# Patient Record
Sex: MALE | Race: WHITE
[De-identification: names, ages, dates, MRNs, and addresses within clinical notes are randomized per-mention and may not be internally consistent; named-entity substitution may affect disease eponyms.]

---

## 2017-01-10 ENCOUNTER — HOSPITAL ENCOUNTER (OUTPATIENT)
Dept: HOSPITAL 45 - X.SURG | Age: 57
Discharge: HOME | End: 2017-01-10
Attending: SURGERY
Payer: COMMERCIAL

## 2017-01-10 VITALS — SYSTOLIC BLOOD PRESSURE: 119 MMHG | OXYGEN SATURATION: 97 % | DIASTOLIC BLOOD PRESSURE: 74 MMHG | HEART RATE: 52 BPM

## 2017-01-10 VITALS
BODY MASS INDEX: 33.82 KG/M2 | WEIGHT: 215.46 LBS | WEIGHT: 215.46 LBS | HEIGHT: 67.01 IN | BODY MASS INDEX: 33.82 KG/M2 | HEIGHT: 67.01 IN

## 2017-01-10 VITALS — TEMPERATURE: 97.52 F

## 2017-01-10 DIAGNOSIS — I73.9: ICD-10-CM

## 2017-01-10 DIAGNOSIS — L91.8: ICD-10-CM

## 2017-01-10 DIAGNOSIS — I25.10: ICD-10-CM

## 2017-01-10 DIAGNOSIS — I10: ICD-10-CM

## 2017-01-10 DIAGNOSIS — Z79.899: ICD-10-CM

## 2017-01-10 DIAGNOSIS — E55.9: ICD-10-CM

## 2017-01-10 DIAGNOSIS — F41.9: ICD-10-CM

## 2017-01-10 DIAGNOSIS — R73.9: ICD-10-CM

## 2017-01-10 DIAGNOSIS — E78.5: ICD-10-CM

## 2017-01-10 DIAGNOSIS — L72.0: Primary | ICD-10-CM

## 2017-01-10 NOTE — ANESTHESIA PROGRESS NT - [facility]
Anesthesia Post Op Note


Date & Time


Rod 10, 2017 at 11:09





Vital Signs


Pain Intensity:  0





 Vital Signs Past 12 Hours








  Date Time  Temp Pulse Resp B/P Pulse Ox O2 Delivery O2 Flow Rate FiO2


 


1/10/17 09:06 36.3 75 16 138/91 95 Room Air  











Notes


Mental Status:  alert / awake / arousable, participated in evaluation


Pt Amnestic to Procedure:  Yes


Nausea / Vomiting:  adequately controlled


Pain:  adequately controlled


Airway Patency, RR, SpO2:  stable & adequate


BP & HR:  stable & adequate


Hydration State:  stable & adequate


Anesthetic Complications:  no major complications apparent

## 2017-01-10 NOTE — [facility] OPERATIVE REPORT
Operative Report


Operative Date


Rod 10, 2017.





Pre-Operative Diagnosis





Sebaceous Cyst On Back x2, Left Groin Skin Tag





Procedure(s) Performed


excision seb cysts times 2 back; excision skin tag groin





Surgeon


Dr. Nicholson





Assistant Surgeon(s)


None





Estimated Blood Loss


5 ml





Specimens





A.) Sebaceous Cyst On Back X2





Anesthesia


mac





Complication(s)


None





Disposition


Recovery Room / PACU


I attest to the content of the Intraoperative Record and any orders documented 

therein.  Any exceptions are noted below.

## 2017-01-10 NOTE — OPERATIVE REPORT
DATE OF OPERATION:  01/10/2017

 

PREOPERATIVE DIAGNOSIS:  Symptomatic cyst of the upper back, as well as a

symptomatic groin skin tag.

 

POSTOPERATIVE DIAGNOSIS:  Same.

 

PROCEDURE:

1.  Excision of sebaceous cyst x2 on the back, one measuring 3 cm and one

measuring 4 cm.

2.  Excision of skin tag of the left groin measuring 1 cm.

 

SURGEON:  Dr. Nicholson.

 

ESTIMATED BLOOD LOSS: 15 mL.

 

COMPLICATIONS:  No immediate.

 

ANESTHESIA:  MAC with local Marcaine.

 

OPERATIVE NOTE:  After informed consent was obtained, the patient was taken

to the operating suite, and placed in a prone position.  IV sedation was

administered by anesthesia and titrated to effect.  The upper back was

sterilely prepped and draped in the usual fashion.  We injected with Marcaine

with epinephrine around both of the upper back soft tissue cysts.  We used a

10 blade scalpel to make a linear incision over the palpable cyst.  Once in

the skin, electrocautery, as well as blunt dissection was used to free up the

cysts.  They were both removed in their entirety.  They were consistent with

sebaceous cysts.  We thoroughly irrigated the wounds.  I closed both wounds

with 3-0 Vicryl for the deep layers and 3-0 simple interrupted nylon for the

skin.  Antibiotic ointment and sterile dressings were applied.

 

The patient was then rolled over into a supine position.  The right groin

skin tag was prepped.  I used Marcaine as a local anesthetic.  I simply

excised the skin tag at its base with electrocautery.  It did not require

suturing.  I placed some antibiotic ointment and a sterile dressing.  The

patient was then awakened and transferred to recovery in stable condition.

 

 

I attest to the content of the Intraoperative Record and any orders documented therein. Any exceptio
ns are noted below.

## 2017-01-10 NOTE — DISCHARGE INSTRUCTIONS-[facility]
Discharge Instructions


Visit


Reason for Visit:  Sebaceous Cysts X 2 On Back; Left Groin Skin Tag





Discharge


Discharge Diagnosis / Problem:  sebaceous cysts times 2 on back; skin tag on 

groin





Discharge Goals


Goal(s):  Decrease discomfort, Improve function





Activity Recommendations


Activity Limitations:  resume your previous activity


Exercise/Sports Limitations:  until after follow-up appointment


May Resume Sexual Activity:  when tolerated


Shower/Bathe:  tomorrow





Anesthesia


.





Post Anesthesia Instructions:





If you have had General Anesthesia or IV Sedation:





*  Do not drive today.


*  Resume driving when surgeon permits.


*  Do not make important decisions or sign legal documents today.


*  Call surgeon for:





   1.  Temperature elevations greater than 101 degrees F.


   2.  Uncontrollable pain.


   3.  Excessive bleeding.


   4.  Persistent nausea and vomiting.


   5.  Medication intolerance (nausea, vomiting or rash).





*  For nausea and vomiting use only clear liquids such as: tea, soda, bouillon 

until nausea subsides, then gradually increase diet as tolerated.





*  If you have any concerns or questions, call your surgeon's office.  If 

physician is unavailable and it is an emergency, call 911 or go to the nearest 

emergency room.





.





Diet Recommendations


Home Diet:  resume previous diet





Procedures


Procedures Performed:  


exicsion of back cysts times 2 and groin skin tag





Pending Studies


Studies pending at discharge:  yes (pathology reports)





Medical Emergencies








.


Who to Call and When:





Medical Emergencies:  If at any time you feel your situation is an emergency, 

please call 911 immediately.





.





Non-Emergent Contact


Non-Emergency issues call your:  Primary Care Provider, Surgeon


Call Non-Emergent contact if:  temperature is above 101, wound has increased 

drainage, wound has increased redness





.


.





"Provider Documentation" section prepared by Robert Nicholson.

## 2017-05-04 ENCOUNTER — HOSPITAL ENCOUNTER (OUTPATIENT)
Dept: HOSPITAL 45 - C.LAB1850 | Age: 57
Discharge: HOME | End: 2017-05-04
Attending: INTERNAL MEDICINE
Payer: COMMERCIAL

## 2017-05-04 DIAGNOSIS — D69.6: ICD-10-CM

## 2017-05-04 DIAGNOSIS — E83.19: ICD-10-CM

## 2017-05-04 DIAGNOSIS — Z12.5: Primary | ICD-10-CM

## 2017-05-04 DIAGNOSIS — E83.42: ICD-10-CM

## 2017-05-04 DIAGNOSIS — E78.5: ICD-10-CM

## 2017-05-04 LAB
ALBUMIN/GLOB SERPL: 1.3 {RATIO} (ref 0.9–2)
ALP SERPL-CCNC: 66 U/L (ref 45–117)
ALT SERPL-CCNC: 31 U/L (ref 12–78)
ANION GAP SERPL CALC-SCNC: 6 MMOL/L (ref 3–11)
AST SERPL-CCNC: 27 U/L (ref 15–37)
BASOPHILS # BLD: 0.01 K/UL (ref 0–0.2)
BASOPHILS NFR BLD: 0.1 %
BUN SERPL-MCNC: 19 MG/DL (ref 7–18)
BUN/CREAT SERPL: 16.1 (ref 10–20)
CALCIUM SERPL-MCNC: 9.4 MG/DL (ref 8.5–10.1)
CHLORIDE SERPL-SCNC: 104 MMOL/L (ref 98–107)
CHOLEST/HDLC SERPL: 2.3 {RATIO}
CO2 SERPL-SCNC: 28 MMOL/L (ref 21–32)
COMPLETE: YES
CREAT SERPL-MCNC: 1.2 MG/DL (ref 0.6–1.4)
EOSINOPHIL NFR BLD AUTO: 107 K/UL (ref 130–400)
GLOBULIN SER-MCNC: 3.4 GM/DL (ref 2.5–4)
GLUCOSE SERPL-MCNC: 78 MG/DL (ref 70–99)
GLUCOSE UR QL: 56 MG/DL
HCT VFR BLD CALC: 48.1 % (ref 42–52)
IG%: 0.1 %
IMM GRANULOCYTES NFR BLD AUTO: 19 %
KETONES UR QL STRIP: 55 MG/DL
LYMPHOCYTES # BLD: 1.4 K/UL (ref 1.2–3.4)
MAGNESIUM SERPL-MCNC: 2.3 MG/DL (ref 1.8–2.4)
MCH RBC QN AUTO: 31.5 PG (ref 25–34)
MCHC RBC AUTO-ENTMCNC: 34.3 G/DL (ref 32–36)
MCV RBC AUTO: 91.8 FL (ref 80–100)
MONOCYTES NFR BLD: 6.6 %
NEUTROPHILS # BLD AUTO: 1.4 %
NEUTROPHILS NFR BLD AUTO: 72.8 %
NITRITE UR QL STRIP: 82 MG/DL (ref 0–150)
PH UR: 127 MG/DL (ref 0–200)
PMV BLD AUTO: 11.8 FL (ref 7.4–10.4)
POTASSIUM SERPL-SCNC: 4.4 MMOL/L (ref 3.5–5.1)
PSA SERPL-MCNC: 0.65 NG/ML (ref 0–4)
RBC # BLD AUTO: 5.24 M/UL (ref 4.7–6.1)
SODIUM SERPL-SCNC: 138 MMOL/L (ref 136–145)
VERY LOW DENSITY LIPOPROT CALC: 16 MG/DL
WBC # BLD AUTO: 7.37 K/UL (ref 4.8–10.8)

## 2017-07-05 ENCOUNTER — HOSPITAL ENCOUNTER (OUTPATIENT)
Dept: HOSPITAL 45 - C.GI | Age: 57
Discharge: HOME | End: 2017-07-05
Attending: INTERNAL MEDICINE
Payer: COMMERCIAL

## 2017-07-05 VITALS — SYSTOLIC BLOOD PRESSURE: 100 MMHG | DIASTOLIC BLOOD PRESSURE: 46 MMHG | HEART RATE: 50 BPM | OXYGEN SATURATION: 95 %

## 2017-07-05 VITALS
WEIGHT: 215.46 LBS | WEIGHT: 215.46 LBS | HEIGHT: 67.01 IN | BODY MASS INDEX: 33.82 KG/M2 | HEIGHT: 67.01 IN | BODY MASS INDEX: 33.82 KG/M2

## 2017-07-05 DIAGNOSIS — K31.7: ICD-10-CM

## 2017-07-05 DIAGNOSIS — K20.9: ICD-10-CM

## 2017-07-05 DIAGNOSIS — K70.30: Primary | ICD-10-CM

## 2017-07-05 DIAGNOSIS — I25.2: ICD-10-CM

## 2017-07-05 DIAGNOSIS — F10.21: ICD-10-CM

## 2017-07-05 DIAGNOSIS — K44.9: ICD-10-CM

## 2017-07-05 DIAGNOSIS — F41.9: ICD-10-CM

## 2017-07-05 DIAGNOSIS — E78.00: ICD-10-CM

## 2017-07-05 DIAGNOSIS — F32.9: ICD-10-CM

## 2017-07-05 DIAGNOSIS — Z79.899: ICD-10-CM

## 2017-07-05 NOTE — ANESTHESIOLOGY PROGRESS NOTE
Anesthesia Post Op Note


Date & Time


Jul 5, 2017 at 14:09





Vital Signs


Pain Intensity:  0





Vital Signs Past 12 Hours








  Date Time  Temp Pulse Resp B/P (MAP) Pulse Ox O2 Delivery O2 Flow Rate FiO2


 


7/5/17 13:20 36.5 54 20 128/72 (90) 96 Room Air  











Notes


Mental Status:  alert / awake / arousable, participated in evaluation


Pt Amnestic to Procedure:  Yes


Nausea / Vomiting:  adequately controlled


Pain:  adequately controlled


Airway Patency, RR, SpO2:  stable & adequate


BP & HR:  stable & adequate


Hydration State:  stable & adequate


Anesthetic Complications:  no major complications apparent

## 2017-07-05 NOTE — GI REPORT
Procedure Date: 7/5/2017 1:44 PM

Procedure:            Upper GI endoscopy

Indications:          Cirrhosis rule out esophageal varices

Medicines:            Monitored Anesthesia Care

Complications:        No immediate complications.

Estimated Blood Loss: Estimated blood loss: none.

Procedure:            Pre-Anesthesia Assessment:

                      - Prior to the procedure, a History and Physical was 

                      performed, and patient medications and allergies were 

                      reviewed. The patient's tolerance of previous 

                      anesthesia was also reviewed. The risks and benefits of 

                      the procedure and the sedation options and risks were 

                      discussed with the patient. All questions were 

                      answered, and informed consent was obtained. Prior 

                      Anticoagulants: The patient has taken no previous 

                      anticoagulant or antiplatelet agents. ASA Grade 

                      Assessment: III - A patient with severe systemic 

                      disease. After reviewing the risks and benefits, the 

                      patient was deemed in satisfactory condition to undergo 

                      the procedure.

                      After obtaining informed consent, the endoscope was 

                      passed under direct vision. Throughout the procedure, 

                      the patient's blood pressure, pulse, and oxygen 

                      saturations were monitored continuously. The scope was 

                      introduced through the mouth, and advanced to the 

                      second part of duodenum. The upper GI endoscopy was 

                      accomplished without difficulty. The patient tolerated 

                      the procedure well.

Findings:

     The Z-line was irregular. Biopsies were taken with a cold forceps for 

     histology.

     A small hiatus hernia was present.

     Multiple 3 to 8 mm sessile polyps with no stigmata of recent bleeding 

     were found in the stomach.

     The examined duodenum was normal.

Impression:           - Z-line irregular. Biopsied.

                      - Small hiatus hernia.

                      - Multiple gastric polyps.

                      - Normal examined duodenum.

Recommendation:       - Resume previous diet.

                      - Continue present medications.

                      - Await pathology results.

                      - Return to GI office as previously scheduled.

Pedro Garcia, DO

7/5/2017 2:03:55 PM

This report has been signed electronically.

Note Initiated On: 7/5/2017 1:44 PM

     I attest to the content of the Intraoperative Record and orders 

     documented therein, exceptions below

## 2017-07-05 NOTE — DISCHARGE INSTRUCTIONS
Endoscopy Patient Instructions


Date / Procedure(s) Performed


Jul 5, 2017.


EGD





Allergy Information


Coded Allergies:  


     Heparin (Verified  Allergy, Severe, 7/25/12, HIT AB POSITIVE, BUT DARRYL 

NEGATIVE, 6/28/17)


 7/25/12 ([id number]) HIT AB POSITIVE, BUT DARRYL NEGATIVE


     Aspirin (Verified  Adverse Reaction, Severe, N/V, 7/5/17)


     Acetaminophen (Unverified  Adverse Reaction, Unknown, GI SYMPTOMS, 6/28/17)


 liver condition


     Adhesives (Verified  Adverse Reaction, Unknown, RASH, 6/28/17)





Discharge Date / Findings


Jul 5, 2017.


Esophageal biopsies


Hiatal hernia


Gastric polyps





Medication Instructions


OK to resume all medications today as prescribed





Reported Home Medications








 Medications  Dose


 Route/Sig


 Max Daily Dose Days Date Category


 


 Lipitor


  (Atorvastatin


 Calcium) 40 Mg Tab  40 Mg


 PO QPM


    6/28/17 Reported


 


 Zoloft


  (Sertraline Hcl)


 50 Mg Tab  50 Mg


 PO QAM


    1/9/17 Reported


 


 Vitamin D


  (Cholecalciferol)


 1,000 Inter.unit


 Tab  1,000 Inter.unit


 PO QAM


    7/9/14 Reported


 


 Vasotec


  (Enalapril


 Maleate) 10 Mg Tab  10 Mg


 PO QAM


    7/9/14 Reported


 


 Mag-Ox (Magnesium


 Oxide) 400 Mg Tab  400 Mg


 PO QAM


    5/11/13 Reported


 


 B-1 (Thiamine


 Hcl) 100 Mg Tab  100 Mg


 PO QAM


    4/9/13 Reported


 


 Chronulac


  (Lactulose) 10


 Gm/15 Ml Syrp  15 Ml


 PO BID


    4/9/13 Reported


 


 Folvite (Folic


 Acid) 1 Mg Tab  1 Mg


 PO QAM


    4/9/13 Reported


 


 Toprol Xl


  (Metoprolol


 Succinate) 100 Mg


 Tab  100 Mg


 PO QAM


    4/9/13 Reported


 


 Protonix


  (Pantoprazole


 Sodium) 40 Mg Tab  40 Mg


 PO QAM


    9/8/12 Reported











Provider Instructions





Activity Restrictions





-  No exercising or heavy lifting for 24 hours. 


-  Do not drink alcohol the day of the procedure.


-  Do not drive a car or operate machinery until the day after the procedure.


-  Do not make any important decisions or sign important papers in 24 hours 

after the procedure.





Following Day:





-  Return to full activity which may include returning to work/school.





Diet





Start your diet with liquids and light foods (jello, soup, juice, toast).  Then 

eat your usual diet if not nauseated.





Treatment For Common After Affects





For mild abdominal pain, bloating, or excessive gas:





-  Rest


-  Eat lightly


-  Lie on right side





Follow-Up Information


Follow-up with DR. HOFF as scheduled





Anesthesia Information





What You Should Know





You have had a procedure that required some medicine to reduce anxiety and 

discomfort. This treatment is called moderate sedation.  


After receiving the treatment, you may be sleepy, but you will be able to 

breathe on your own.  The effects of the treatment may last for several hours.








Follow these instructions along with Activity/Diet recommendations noted above:





*  Do NOT do anything where dizziness or clumsiness would be dangerous.





*  Rest quietly at home today, then you can be up and about tomorrow.





*  Have a responsible person stay with you the rest of today.





*  You may have had an I.V. today.  If so, you may take the dressing off later 

today.





Recommendations


 


Call your doctor if:





*  Trouble breathing 





*  Continuous vomiting for more than 24 hours








*  Temperature above 101 degrees





*  Severe abdominal pain or bloating





*  Pain not relieved by pain medicine ordered





*  There is increased drainage or redness from any incision





*  A large amount of rectal bleeding greater than 2-3 tablespoons. 


   (If you had a polyp/s removed or have hemorrhoids, a small amount of blood -


    from the rectum is to be expected.)





*  You have any unanswered questions or concerns.








IN THE EVENT OF A SERIOUS EMERGENCY, GO TO THE NEAREST EMERGENCY ROOM








       Your discharge instructions were prepared by provider Pedro Garcia.





 Patient Instructions Signature Page














Fredi Lopez 











Patient (or Guardian) Signature/Date:____________________________________ I 

have read and understand the instructions given to me by my caregivers.








Caregiver/RN/Doctor Signature/Date:____________________________________











The above-named patient and/or guardian has received patient instructions on 

this date.





























+  Original Patient Signature Page (only) stays with chart.  Please make copy 

for patient.

## 2017-07-05 NOTE — ENDO HISTORY AND PHYSICAL
History & Physical


Date of Service:


Jul 5, 2017.


Chief Complaint:


ESOPHAGEAL VARICES


Referring Physician:


DR. HOFF


History of Present Illness


57 yo CM who presents for EGD secondary to esophageal varices.





Past Medical History


Arthritis, Anxiety, High Cholesterol, Heart Disease, Liver Disease, Other, 

Depression, MI





Past Surgical History


Hx Cardiac Surgery:  Yes (HEART CATH, STENT X1)


Hx Internal Defibrillator:  No


Hx Pacemaker:  No


Hx Abdominal Surgery:  Yes (POLLY)


Hx of Implantable Prosthesis:  No


Hx Post-Op Nausea and Vomiting:  No


Hx Cancer Surgery:  No


Hx Thoracic Surgery:  No


Hx Orthopedic:  No


Hx Urinary Tract Surgery:  No





Family History


None





Social History


Smoking Status:  Never Smoker


Hx Substance Use:  Yes (SMOKES MARIJUANA NIGHTLY)


Hx Alcohol Use:  Yes (QUIT 5 YEARS AGO)





Allergies


Coded Allergies:  


     Heparin (Verified  Allergy, Severe, 7/25/12, HIT AB POSITIVE, BUT DARRYL 

NEGATIVE, 6/28/17)


 7/25/12 ([id number]) HIT AB POSITIVE, BUT DARRYL NEGATIVE


     Aspirin (Verified  Adverse Reaction, Severe, N/V, 7/5/17)


     Acetaminophen (Unverified  Adverse Reaction, Unknown, GI SYMPTOMS, 6/28/17)


 liver condition


     Adhesives (Verified  Adverse Reaction, Unknown, RASH, 6/28/17)





Current Medications





Reported Home Medications








 Medications  Dose


 Route/Sig


 Max Daily Dose Days Date Category


 


 Lipitor


  (Atorvastatin


 Calcium) 40 Mg Tab  40 Mg


 PO QPM


    6/28/17 Reported


 


 Zoloft


  (Sertraline Hcl)


 50 Mg Tab  50 Mg


 PO QAM


    1/9/17 Reported


 


 Vitamin D


  (Cholecalciferol)


 1,000 Inter.unit


 Tab  1,000 Inter.unit


 PO QAM


    7/9/14 Reported


 


 Vasotec


  (Enalapril


 Maleate) 10 Mg Tab  10 Mg


 PO QAM


    7/9/14 Reported


 


 Mag-Ox (Magnesium


 Oxide) 400 Mg Tab  400 Mg


 PO QAM


    5/11/13 Reported


 


 B-1 (Thiamine


 Hcl) 100 Mg Tab  100 Mg


 PO QAM


    4/9/13 Reported


 


 Chronulac


  (Lactulose) 10


 Gm/15 Ml Syrp  15 Ml


 PO BID


    4/9/13 Reported


 


 Folvite (Folic


 Acid) 1 Mg Tab  1 Mg


 PO QAM


    4/9/13 Reported


 


 Toprol Xl


  (Metoprolol


 Succinate) 100 Mg


 Tab  100 Mg


 PO QAM


    4/9/13 Reported


 


 Protonix


  (Pantoprazole


 Sodium) 40 Mg Tab  40 Mg


 PO QAM


    9/8/12 Reported











Vital Signs


Weight (Kilograms):  97.73


Height (Feet):  5


Height (Inches):  7











  Date Time  Temp Pulse Resp B/P (MAP) Pulse Ox O2 Delivery O2 Flow Rate FiO2


 


7/5/17 13:20 36.5 54 20 128/72 (90) 96 Room Air  











Physical Exam


General Appearance:  WD/WN, no apparent distress


Respiratory/Chest:  


   Auscultation:  breath sounds normal


Cardiovascular:  


   Heart Auscultation:  RRR


Abdomen:  


   Bowel Sounds:  normal


   Inspection & Palpation:  soft, non-distended, no tenderness, guarding & 

rebound





Assessment and Plan


Assessment:


57 yo CM who presents for EGD secondary to esophageal varices.








Plan:


Proceed with EGD.

## 2017-07-27 ENCOUNTER — HOSPITAL ENCOUNTER (OUTPATIENT)
Dept: HOSPITAL 45 - C.ULTRBC | Age: 57
Discharge: HOME | End: 2017-07-27
Attending: PHYSICIAN ASSISTANT
Payer: COMMERCIAL

## 2017-07-27 DIAGNOSIS — K74.60: Primary | ICD-10-CM

## 2017-07-27 NOTE — DIAGNOSTIC IMAGING REPORT
(LIVER) ABDOMEN LIMITED



CLINICAL HISTORY: K74.60 Hepatic cirrhosis    



TECHNIQUE: Ultrasound



COMPARISON STUDY:  3/7/2016



FINDINGS: Heterogeneous liver internal architecture with findings suggesting

hepatic cirrhotic change. No change in the prior exam. Prior cholecystectomy.

Perfusion components of the pancreas are unremarkable. Right kidney is negative

for hydronephrosis.



IMPRESSION:  Findings suggesting hepatic cirrhotic change with no change in

appearance compared to the prior study. Prior cholecystectomy. 









The above report was generated using voice recognition software.  It may contain

grammatical, syntax or spelling errors.







Electronically signed by:  Eros Rodriguez M.D.

7/27/2017 11:22 AM



Dictated Date/Time:  7/27/2017 11:17 AM

## 2017-10-14 ENCOUNTER — HOSPITAL ENCOUNTER (EMERGENCY)
Dept: HOSPITAL 45 - C.EDB | Age: 57
Discharge: HOME | End: 2017-10-14
Payer: COMMERCIAL

## 2017-10-14 VITALS — SYSTOLIC BLOOD PRESSURE: 150 MMHG | OXYGEN SATURATION: 95 % | DIASTOLIC BLOOD PRESSURE: 88 MMHG | HEART RATE: 58 BPM

## 2017-10-14 VITALS — TEMPERATURE: 97.52 F

## 2017-10-14 VITALS
WEIGHT: 234.79 LBS | WEIGHT: 234.79 LBS | HEIGHT: 67.01 IN | BODY MASS INDEX: 36.85 KG/M2 | HEIGHT: 67.01 IN | BODY MASS INDEX: 36.85 KG/M2

## 2017-10-14 VITALS — OXYGEN SATURATION: 97 %

## 2017-10-14 DIAGNOSIS — E78.5: ICD-10-CM

## 2017-10-14 DIAGNOSIS — K74.60: ICD-10-CM

## 2017-10-14 DIAGNOSIS — F12.90: ICD-10-CM

## 2017-10-14 DIAGNOSIS — M54.2: ICD-10-CM

## 2017-10-14 DIAGNOSIS — I44.4: ICD-10-CM

## 2017-10-14 DIAGNOSIS — I10: ICD-10-CM

## 2017-10-14 DIAGNOSIS — I45.10: ICD-10-CM

## 2017-10-14 DIAGNOSIS — I25.10: ICD-10-CM

## 2017-10-14 DIAGNOSIS — Z95.5: ICD-10-CM

## 2017-10-14 DIAGNOSIS — G45.9: Primary | ICD-10-CM

## 2017-10-14 DIAGNOSIS — Z87.442: ICD-10-CM

## 2017-10-14 DIAGNOSIS — I73.9: ICD-10-CM

## 2017-10-14 DIAGNOSIS — K21.9: ICD-10-CM

## 2017-10-14 LAB
ANION GAP SERPL CALC-SCNC: 5 MMOL/L (ref 3–11)
APPEARANCE UR: CLEAR
BASOPHILS # BLD: 0.02 K/UL (ref 0–0.2)
BASOPHILS NFR BLD: 0.5 %
BENZODIAZ UR-MCNC: (no result) UG/L
BILIRUB UR-MCNC: (no result) MG/DL
BUN SERPL-MCNC: 19 MG/DL (ref 7–18)
BUN/CREAT SERPL: 17.4 (ref 10–20)
CALCIUM SERPL-MCNC: 9.1 MG/DL (ref 8.5–10.1)
CHLORIDE SERPL-SCNC: 106 MMOL/L (ref 98–107)
CKMB/CK RATIO: 1.3 (ref 0–3)
CO2 SERPL-SCNC: 27 MMOL/L (ref 21–32)
COLOR UR: YELLOW
COMPLETE: YES
CREAT CL PREDICTED SERPL C-G-VRATE: 87.3 ML/MIN
CREAT SERPL-MCNC: 1.1 MG/DL (ref 0.6–1.4)
EOSINOPHIL NFR BLD AUTO: 91 K/UL (ref 130–400)
GLUCOSE SERPL-MCNC: 146 MG/DL (ref 70–99)
HCT VFR BLD CALC: 43.6 % (ref 42–52)
IG%: 1 %
IMM GRANULOCYTES NFR BLD AUTO: 30.5 %
INR PPP: 1 (ref 0.9–1.1)
LYMPHOCYTES # BLD: 1.17 K/UL (ref 1.2–3.4)
MANUAL MICROSCOPIC REQUIRED?: NO
MCH RBC QN AUTO: 31.1 PG (ref 25–34)
MCHC RBC AUTO-ENTMCNC: 34.9 G/DL (ref 32–36)
MCV RBC AUTO: 89.2 FL (ref 80–100)
MONOCYTES NFR BLD: 7.6 %
NEUTROPHILS # BLD AUTO: 4.4 %
NEUTROPHILS NFR BLD AUTO: 56 %
NITRITE UR QL STRIP: (no result)
PARTIAL THROMBOPLASTIN RATIO: 0.9
PCP UR-MCNC: (no result) UG/L
PH UR STRIP: 5.5 [PH] (ref 4.5–7.5)
PLATELET # BLD EST: (no result) 10*3/UL
PMV BLD AUTO: 12 FL (ref 7.4–10.4)
POTASSIUM SERPL-SCNC: 4.1 MMOL/L (ref 3.5–5.1)
PROTHROMBIN TIME: 10.5 SECONDS (ref 9–12)
RBC # BLD AUTO: 4.89 M/UL (ref 4.7–6.1)
REVIEW REQ?: NO
SODIUM SERPL-SCNC: 137 MMOL/L (ref 136–145)
SP GR UR STRIP: 1.03 (ref 1–1.03)
URINE BILL WITH OR WITHOUT MIC: (no result)
UROBILINOGEN UR-MCNC: (no result) MG/DL
WBC # BLD AUTO: 3.84 K/UL (ref 4.8–10.8)
ZZUR CULT IF INDIC CLEAN CATCH: NO

## 2017-10-14 NOTE — EMERGENCY ROOM VISIT NOTE
History


Report prepared by Madan:  Laura Ellis


Under the Supervision of:  Dr. Kandis Tinoco M.D.


First contact with patient:  12:29


Chief Complaint:  NEURO SYMPTOMS


Stated Complaint:  SHOULDER PAIN, LEFT LOWER ARM PAIN, L FACE NUMBNES





History of Present Illness


The patient is a 57 year old male who presents to the Emergency Room with 

complaints of persistent numbness in left lower arm and left side of face that 

began over the past couple of days. The patient rates his pain a 6/10 in 

severity. He states that the pain is centered around his left elbow but it 

extends to the left side of the neck. The patient denies any speech difficulty 

or difficulty walking. He reports that he has a coronary stent in place. The 

patient notes that he has had a kidney stone and a "femur bypass" due to a 

blockage behind the knee in the past. He states that he is experiencing a 

headache and neck pain. The patient additionally reports nausea, but denies any 

vomiting.  The patient notes that he occasionally uses marijuana.





   Source of History:  patient


   Onset:  past couple days


   Position:  arm (left), other (left side of face)


   Symptom Intensity:  6/10


   Quality:  numbness


   Timing:  other (persistent)


   Associated Symptoms:  + headache, + neck pain, + nausea, No vomiting





Review of Systems


See HPI for pertinent positives & negatives. A total of 10 systems reviewed and 

were otherwise negative.





Past Medical & Surgical


Medical Problems:


(1) Coronary artery disease


(2) ESOPHAGEAL REFLUX


(3) HYPERLIPIDEMIA NEC/NOS


(4) Peripheral artery disease


Surgical Problems:


(1) S/P popliteal-distal bypass surgery








Coronary artery disease





Peripheral artery disease





Family History





Heart disease





Social History


Smoking Status:  Never Smoker


Alcohol Use:  none


Drug Use:  marijuana


Marital Status:  single


Housing Status:  lives alone


Occupation Status:  other





Current/Historical Medications


Scheduled


Atorvastatin (Lipitor), 40 MG PO QPM


Baclofen (Lioresal), 10 MG PO TID


Cholecalciferol (D 1000), 1 CAP PO DAILY


Enalapril (Vasotec), 10 MG PO QAM


Folic Acid (Folvite), 1 MG PO QAM


Lactulose (Chronulac), 15 ML PO DAILY


Magnesium Oxide (Mag-Ox), 400 MG PO QAM


Metoprolol Succinate (Toprol Xl), 100 MG PO QAM


Pantoprazole Sodium (Protonix), 40 MG PO QAM


Thiamine Hcl (B-1), 100 MG PO QAM





Allergies


Coded Allergies:  


     Heparin (Verified  Allergy, Severe, 7/25/12, HIT AB POSITIVE, BUT DARRYL 

NEGATIVE, 10/14/17)


 7/25/12 ([id number]) HIT AB POSITIVE, BUT DARRYL NEGATIVE


     Aspirin (Verified  Adverse Reaction, Severe, N/V, 10/14/17)


     Acetaminophen (Unverified  Adverse Reaction, Unknown, GI SYMPTOMS, 10/14/17

)


 liver condition


     Adhesives (Verified  Adverse Reaction, Unknown, RASH, 10/14/17)





Physical Exam


Vital Signs











  Date Time  Temp Pulse Resp B/P (MAP) Pulse Ox O2 Delivery O2 Flow Rate FiO2


 


10/14/17 16:20  58 18 150/88 95 Room Air  


 


10/14/17 14:26  57 16 140/82 97 Room Air  


 


10/14/17 13:08  57 15 145/72 95 Room Air  


 


10/14/17 12:51  60      


 


10/14/17 12:39     97 Room Air  


 


10/14/17 12:25 36.4 62 16 138/85 96 Room Air  











Physical Exam


Vital signs reviewed.





General: Well-appearing male, in no significant distress.





HEENT: No scleral icterus, PERRLA, neck supple.  Atraumatic.





Cardiovascular: Regular rate and rhythm, no extra sounds.





Pulmonary: Clear to auscultation bilaterally, normal work of breathing.





Abdomen: Soft, nontender, nondistended, positive bowel sounds.





Musculoskeletal: Atraumatic, no peripheral edema.





Neurologic: Patient awake alert and oriented x 3, full strength in all 4 

extremities.  Cranial nerves 2 through 12 grossly intact.





Skin: Warm, dry, no rash





Medical Decision & Procedures


ER Provider


Diagnostic Interpretation:


CT results as stated below per my review and radiologist interpretation: 





CT SCAN OF THE BRAIN WITHOUT IV CONTRAST





CLINICAL HISTORY: Strokelike symptoms.





COMPARISON STUDY:  CT of the brain dated 12/9/2009.





TECHNIQUE: Unenhanced axial CT scan of the brain is performed from the vertex to


the skull base.





CT DOSE: 614.27 mGy.cm





FINDINGS:





Brain parenchyma: There is minimal subcortical and periventricular


microangiopathic change. Foci of left frontal and left parietal encephalomalacia


are unchanged and consistent with remote infarcts. There is no hemorrhage, mass


effect, or evidence of acute territorial ischemia by CT criteria. Gray-white


matter is preserved. No extra-axial fluid collection is seen.





Ventricles, sulci, cisterns: Normal in configuration.





Intracranial vasculature: There is atherosclerotic calcification of the


cavernous carotid and vertebral arteries.





Calvarium: Unremarkable.





Sinuses and mastoids: The visualized paranasal sinuses are clear. The mastoid


air cells are well pneumatized.





Orbits: The bony orbits are grossly intact.








IMPRESSION:





1. There is no hemorrhage, mass effect, or evidence of acute territorial


ischemia by CT criteria.





2. Remote infarcts as above, unchanged from 2009.





Electronically signed by:  Ti Castillo M.D.


10/14/2017 1:44 PM





Dictated Date/Time:  10/14/2017 1:42 PM





Laboratory Results


10/14/17 12:40








Red Blood Count 4.89, Mean Corpuscular Volume 89.2, Mean Corpuscular Hemoglobin 

31.1, Mean Corpuscular Hemoglobin Concent 34.9, Mean Platelet Volume 12.0, 

Neutrophils (%) (Auto) 56.0, Lymphocytes (%) (Auto) 30.5, Monocytes (%) (Auto) 

7.6, Eosinophils (%) (Auto) 4.4, Basophils (%) (Auto) 0.5, Neutrophils # (Auto) 

2.15, Lymphocytes # (Auto) 1.17, Monocytes # (Auto) 0.29, Eosinophils # (Auto) 

0.17, Basophils # (Auto) 0.02





10/14/17 12:40

















Test


  10/14/17


12:40 10/14/17


12:54 10/14/17


13:10


 


White Blood Count


  3.84 K/uL


(4.8-10.8) 


  


 


 


Red Blood Count


  4.89 M/uL


(4.7-6.1) 


  


 


 


Hemoglobin


  15.2 g/dL


(14.0-18.0) 


  


 


 


Hematocrit 43.6 % (42-52)   


 


Mean Corpuscular Volume


  89.2 fL


() 


  


 


 


Mean Corpuscular Hemoglobin


  31.1 pg


(25-34) 


  


 


 


Mean Corpuscular Hemoglobin


Concent 34.9 g/dl


(32-36) 


  


 


 


Platelet Count


  91 K/uL


(130-400) 


  


 


 


Mean Platelet Volume


  12.0 fL


(7.4-10.4) 


  


 


 


Neutrophils (%) (Auto) 56.0 %   


 


Lymphocytes (%) (Auto) 30.5 %   


 


Monocytes (%) (Auto) 7.6 %   


 


Eosinophils (%) (Auto) 4.4 %   


 


Basophils (%) (Auto) 0.5 %   


 


Neutrophils # (Auto)


  2.15 K/uL


(1.4-6.5) 


  


 


 


Lymphocytes # (Auto)


  1.17 K/uL


(1.2-3.4) 


  


 


 


Monocytes # (Auto)


  0.29 K/uL


(0.11-0.59) 


  


 


 


Eosinophils # (Auto)


  0.17 K/uL


(0-0.5) 


  


 


 


Basophils # (Auto)


  0.02 K/uL


(0-0.2) 


  


 


 


RDW Standard Deviation


  39.6 fL


(36.4-46.3) 


  


 


 


RDW Coefficient of Variation


  12.4 %


(11.5-14.5) 


  


 


 


Immature Granulocyte % (Auto) 1.0 %   


 


Immature Granulocyte # (Auto)


  0.04 K/uL


(0.00-0.02) 


  


 


 


Platelet Estimate DECREASED   


 


Prothrombin Time


  10.5 SECONDS


(9.0-12.0) 


  


 


 


Prothromb Time International


Ratio 1.0 (0.9-1.1) 


  


  


 


 


Activated Partial


Thromboplast Time 24.5 SECONDS


(21.0-31.0) 


  


 


 


Partial Thromboplastin Ratio 0.9   


 


Anion Gap


  5.0 mmol/L


(3-11) 


  


 


 


Est Creatinine Clear Calc


Drug Dose 87.3 ml/min 


  


  


 


 


Estimated GFR (


American) 86.5 


  


  


 


 


Estimated GFR (Non-


American 74.6 


  


  


 


 


BUN/Creatinine Ratio 17.4 (10-20)   


 


Calcium Level


  9.1 mg/dl


(8.5-10.1) 


  


 


 


Total Creatine Kinase


  151 U/L


() 


  


 


 


Creatine Kinase MB


  2.0 ng/ml


(0.5-3.6) 


  


 


 


Creatine Kinase MB Ratio 1.3 (0-3.0)   


 


Troponin I


  < 0.015 ng/ml


(0-0.045) 


  


 


 


Bedside Prothrombin Time INR  1.0 (0.9-1.1)  


 


Bedside Glucose


  


  125 mg/dl


(70-99) 


 


 


Urine Color   YELLOW 


 


Urine Appearance   CLEAR (CLEAR) 


 


Urine pH   5.5 (4.5-7.5) 


 


Urine Specific Gravity


  


  


  1.030


(1.000-1.030)


 


Urine Protein   NEG (NEG) 


 


Urine Glucose (UA)   NEG (NEG) 


 


Urine Ketones   NEG (NEG) 


 


Urine Occult Blood   NEG (NEG) 


 


Urine Nitrite   NEG (NEG) 


 


Urine Bilirubin   NEG (NEG) 


 


Urine Urobilinogen   NEG (NEG) 


 


Urine Leukocyte Esterase   NEG (NEG) 


 


Urine Opiates Screen   NEG (NEG) 


 


Urine Methadone, Qualitative   NEG (NEG) 


 


Urine Barbiturates   NEG (NEG) 


 


Urine Phencyclidine (PCP)


Level 


  


  NEG (NEG) 


 


 


Ur


Amphetamine/Methamphetamine 


  


  NEG (NEG) 


 


 


MDMA (Ecstasy) Screen   NEG (NEG) 


 


Urine Benzodiazepines Screen   NEG (NEG) 


 


Urine Cocaine Metabolite   NEG (NEG) 


 


Urine Marijuana (THC)   POS (NEG) 








Laboratory results per my review.





Medications Administered











 Medications


  (Trade)  Dose


 Ordered  Sig/Ruma


 Route  Start Time


 Stop Time Status Last Admin


Dose Admin


 


 Sodium Chloride  1,000 ml @ 


 100 mls/hr  Q10H


 IV  10/14/17 12:45


 10/14/17 17:10 DC 10/14/17 13:01


100 MLS/HR


 


 Prochlorperazine


 Edisylate


  (Compazine Inj)  10 mg  NOW  STAT


 IV  10/14/17 12:45


 10/14/17 12:48 DC 10/14/17 13:01


10 MG


 


 Diphenhydramine


 HCl


  (Benadryl Inj)  25 mg  NOW  STAT


 IV  10/14/17 12:45


 10/14/17 12:48 DC 10/14/17 13:01


25 MG


 


 Hydroxyzine HCl


  (Vistaril Tab)  25 mg  NOW  STAT


 PO  10/14/17 14:18


 10/14/17 14:19 DC 10/14/17 14:31


25 MG











ECG


Indication:  other (numbness)


Rate (beats per minute):  62


Rhythm:  normal sinus


Findings:  LAFB, RBBB, no ectopy, other (repolarization abnormality in the 

anteiror leads)





ED Course


1244: Past medical records reviewed. The patient was evaluated in room A10. A 

complete history and physical examination was performed. 





1245: Ordered Benadryl Inj 25 mg IV, Compazine Inj 10 mg IV, Sodium Chloride 

1000 ml @ 100 mls/hr IV.





141: I reevaluated the patient and he is resting comfortably.  I discussed the 

exam findings with him and I discussed the treatment plan.  He verbalized 

complete understanding and agreement.  He is going to be evaluated for further 

treatment.





1418: Vistaril Tab 25 mg PO.





1445: I discussed the patient's case with Dr. Gottlieb [facility] [place].  He is going 

to evaluate the patient for further treatment.





Medical Decision


Differential diagnosis:


Etiologies such as metabolic, infection, hypo/hyperglycemia, electrolyte 

abnormalities, cardiac sources, intracerebral event, toxicologic, neurologic, 

as well as others were entertained. 





This pt was evaluated and appeared to be in no distress.  IV access was 

obtained and lab work was drawn.  Patient's symptomatology including left 

facial numbness, shoulder and arm numbness is concerning for TIA.  CT scan of 

the head was performed and reveals old infarct.  Patient chose a normal sinus 

rhythm with a left anterior fascicular block, right bundle branch block on EKG.

  No evidence of acute ischemic change.  He is a vasculopath with a previous 

arterial bypass and cardiac stent.  While this may be orthopedic in nature, I 

am concerned for potential cerebral event.  The patient has no acute neurologic 

deficit at this time.  The patient will be evaluated by the hospitalist service 

for further management.  He is aware of the plan and agrees.





Medication Reconcilliation


Current Medication List:  was personally reviewed by me





Consults


Time Called:  1424


Consulting Physician:  PAULINE Seals


Returned Call:  [phone]


I discussed the patient's case with PAULINE Seals.  He is going to 

evaluate the patient for further treatment.





Impression





 Primary Impression:  


 TIA (transient ischemic attack)





Scribe Attestation


The scribe's documentation has been prepared under my direction and personally 

reviewed by me in its entirety. I confirm that the note above accurately 

reflects all work, treatment, procedures, and medical decision making performed 

by me.





Departure Information


Dispostion


Being Evaluated By Hospitalist





Prescriptions





Baclofen (LIORESAL) 10 Mg Tab


10 MG PO TID for 10 Days, #30 TAB


   Prov: Irvin Tai MD         10/14/17





Referrals


GUNNAR Holt MD (PCP)

## 2017-10-14 NOTE — DIAGNOSTIC IMAGING REPORT
CT SCAN OF THE CERVICAL SPINE WITH IV CONTRAST



CLINICAL HISTORY: Left upper extremity radiculopathy. Shoulder pain.



COMPARISON STUDY:  CT scan of the cervical spine dated to/12/2009.



TECHNIQUE: CT scan of the cervical spine is performed from the skull base to the

upper thoracic spine following the IV administration of 102 cc of Optiray 320.

Images are reviewed in the axial, sagittal, and coronal planes. IV contrast was

administered without complication.  A dose lowering technique was utilized

adhering to the principles of ALARA.



CT DOSE: 308.94 mGy.cm



FINDINGS:



Skeletal structures: The skeletal structures are well mineralized. There is no

evidence of fracture or subluxation involving the cervical spine. Vertebral body

height and alignment are maintained. There is straightening of the cervical

lordosis with reversal centered at C4-C5. The odontoid process and lateral

masses are intact. The atlantoaxial articulation is preserved noting mild

productive degenerative change. The spinous processes appear intact.

Degenerative plate sclerosis is seen at C5-C6 and C6-C7. Anterior osteophytes

are seen in the lower cervical region. Uncovertebral and facet arthropathy

contribute to moderate severe bilateral neural foraminal stenosis at C5-C6 and

C6-C7. This is greatest on the right at C6-C7.



Intervertebral discs: Moderate disc space narrowing is seen at C5-C6, C6-C7, and

C7-T1. The remaining disc spaces are well maintained.



Central canal: Posterior disc osteophyte complexes at C5-C6 and C6-C7 likely

contribute to acquired compromise of the central canal.



Soft tissues: The prevertebral and paraspinous soft tissues are within normal

limits. The carotid arteries and jugular veins are widely patent. No enhancing

soft tissue lesion is seen.



Calvarium: The visualized calvarium at the skull base appears intact.



Brain parenchyma: Partially visualized brain parenchyma the skull base is within

normal limits.



Sinuses and mastoids: Mild mucosal thickening is seen in the sphenoid sinuses.

There is a left mastoid effusion. The right mastoid air cells are well

pneumatized.





IMPRESSION:



1. There is no evidence of fracture or subluxation involving the cervical spine.



2. Cervical spondylosis as above, greatest at C5-C6 and C6-C7.



3. No enhancing lesion is identified.







Electronically signed by:  Ti Castillo M.D.

10/14/2017 3:54 PM



Dictated Date/Time:  10/14/2017 3:47 PM

## 2017-10-14 NOTE — MEDICAL CONSULT
Consultation


Date of Consultation:


Oct 14, 2017.


Attending Physician:





Reason for Consultation:


Bilateral upper extremity dysesthesias and left arm pain


History of Present Illness


The patient is a 57-year-old male who presents to the emergency department with 

complaint of bilateral numbness/tingling sensations in both hands, left worse 

than right,  and positional numbness in her left arm to be worse when sitting 

upright.  He also reports discomfort over his left neck and shoulder muscles.  

He's had no history of trauma in the past or recently.  He has not had any 

difficulty with speech or walking or swallowing.  He denies numbness or 

tingling or weakness in legs.





Past Medical/Surgical History


Medical Problems:


(1) Neck pain


Status: Acute  





(2) TIA (transient ischemic attack)


Status: Acute  











Family History





Heart disease





Social History


Smoking Status:  Never Smoker


Smokeless Tobacco Use:  No


Alcohol Use:  none


Drug Use:  marijuana


Marital Status:  single


Housing Status:  lives alone


Occupation Status:  other





Allergies


Coded Allergies:  


     Heparin (Verified  Allergy, Severe, 7/25/12, HIT AB POSITIVE, BUT DARRYL 

NEGATIVE, 10/14/17)


 7/25/12 ([id number]) HIT AB POSITIVE, BUT DARRYL NEGATIVE


     Aspirin (Verified  Adverse Reaction, Severe, N/V, 10/14/17)


     Acetaminophen (Unverified  Adverse Reaction, Unknown, GI SYMPTOMS, 10/14/17

)


 liver condition


     Adhesives (Verified  Adverse Reaction, Unknown, RASH, 10/14/17)





Current Inpatient Medications





Current Inpatient Medications








 Medications


  (Trade)  Dose


 Ordered  Sig/Ruma


 Route  Start Time


 Stop Time Status Last Admin


Dose Admin


 


 Sodium Chloride  1,000 ml @ 


 100 mls/hr  Q10H


 IV  10/14/17 12:45


 11/13/17 12:44  10/14/17 13:01


100 MLS/HR


 


 Ioversol


  (Optiray 320)  100 ml  UD  PRN


 IV  10/14/17 15:15


 10/18/17 15:14   


 











Review of Systems


The patient denies chest pain, palpitations, shortness of breath, cough, lower 

extremity swelling, vision change, hearing change, 


sore throat, fevers, chills, sweats, weight change, fatigue, nausea, vomiting, 

diarrhea or constipation, abdominal pain, pelvic pain, 


blood in urine or stool, dysuria, urinary frequency or urgency, lightheadedness

, dizziness,  memory loss, rash, abnormal bruising or bleeding,


imbalance, generalized weakness, numbness or tingling in legs, generalized 

arthralgias or myalgias, back pain, or night sweats.


The review of systems is otherwise negative other than for that already noted 

above, and at least 10 systems have been reviewed.





Physical Exam











  Date Time  Temp Pulse Resp B/P (MAP) Pulse Ox O2 Delivery O2 Flow Rate FiO2


 


10/14/17 16:20  58 18 150/88 95 Room Air  


 


10/14/17 14:26  57 16 140/82 97 Room Air  


 


10/14/17 13:08  57 15 145/72 95 Room Air  


 


10/14/17 12:51  60      


 


10/14/17 12:39     97 Room Air  


 


10/14/17 12:25 36.4 62 16 138/85 96 Room Air  








The patient is awake, well-developed and adequately nourished, alert and 

oriented 3, normocephalic and atraumatic, lying in bed and in no acute 

distress.


HEENT--PERRL, EOMI, mucous membranes  and oropharynx dry.


Neck--mild tenderness over left paraspinal and scapular muscles, no JVD or 

bruits, thyroid normal, trachea midline, no adenopathy.


Heart--normal S1 and S2, no extra beats, no murmurs, rubs or gallops.


Lungs--clear bilaterally with good air movement, no respiratory distress, no 

accessory muscle use.


Abdomen--normal bowel sounds and soft, nontender and nondistended, no hernias 

or masses,  no organomegaly.


Extremities--no cyanosis, clubbing or edema. There are good distal pulses b/l.


Dermatologic--normal skin turgor, normal color, warm and dry, no abnormal lymph 

nodes, no rash.


Neurologic--cranial nerves II through XII grossly intact.  Decreased sensation 

to light touch left upper extremity greater than right.


Motor strength equal upper and lower extremities bilaterally.


Rheumatologic--normal range of motion, nontender, muscles and joints.


Psychiatric--normal affect.





Laboratory Results





Last 24 Hours








Test


  10/14/17


12:40 10/14/17


12:54 10/14/17


13:10


 


White Blood Count 3.84 K/uL   


 


Red Blood Count 4.89 M/uL   


 


Hemoglobin 15.2 g/dL   


 


Hematocrit 43.6 %   


 


Mean Corpuscular Volume 89.2 fL   


 


Mean Corpuscular Hemoglobin 31.1 pg   


 


Mean Corpuscular Hemoglobin


Concent 34.9 g/dl 


  


  


 


 


Platelet Count 91 K/uL   


 


Mean Platelet Volume 12.0 fL   


 


Neutrophils (%) (Auto) 56.0 %   


 


Lymphocytes (%) (Auto) 30.5 %   


 


Monocytes (%) (Auto) 7.6 %   


 


Eosinophils (%) (Auto) 4.4 %   


 


Basophils (%) (Auto) 0.5 %   


 


Neutrophils # (Auto) 2.15 K/uL   


 


Lymphocytes # (Auto) 1.17 K/uL   


 


Monocytes # (Auto) 0.29 K/uL   


 


Eosinophils # (Auto) 0.17 K/uL   


 


Basophils # (Auto) 0.02 K/uL   


 


RDW Standard Deviation 39.6 fL   


 


RDW Coefficient of Variation 12.4 %   


 


Immature Granulocyte % (Auto) 1.0 %   


 


Immature Granulocyte # (Auto) 0.04 K/uL   


 


Platelet Estimate DECREASED   


 


Prothrombin Time 10.5 SECONDS   


 


Prothromb Time International


Ratio 1.0 


  


  


 


 


Activated Partial


Thromboplast Time 24.5 SECONDS 


  


  


 


 


Partial Thromboplastin Ratio 0.9   


 


Sodium Level 137 mmol/L   


 


Potassium Level 4.1 mmol/L   


 


Chloride Level 106 mmol/L   


 


Carbon Dioxide Level 27 mmol/L   


 


Anion Gap 5.0 mmol/L   


 


Blood Urea Nitrogen 19 mg/dl   


 


Creatinine 1.10 mg/dl   


 


Est Creatinine Clear Calc


Drug Dose 87.3 ml/min 


  


  


 


 


Estimated GFR (


American) 86.5 


  


  


 


 


Estimated GFR (Non-


American 74.6 


  


  


 


 


BUN/Creatinine Ratio 17.4   


 


Random Glucose 146 mg/dl   


 


Calcium Level 9.1 mg/dl   


 


Total Creatine Kinase 151 U/L   


 


Creatine Kinase MB 2.0 ng/ml   


 


Creatine Kinase MB Ratio 1.3   


 


Troponin I < 0.015 ng/ml   


 


Bedside Prothrombin Time INR  1.0  


 


Bedside Glucose  125 mg/dl  


 


Urine Color   YELLOW 


 


Urine Appearance   CLEAR 


 


Urine pH   5.5 


 


Urine Specific Gravity   1.030 


 


Urine Protein   NEG 


 


Urine Glucose (UA)   NEG 


 


Urine Ketones   NEG 


 


Urine Occult Blood   NEG 


 


Urine Nitrite   NEG 


 


Urine Bilirubin   NEG 


 


Urine Urobilinogen   NEG 


 


Urine Leukocyte Esterase   NEG 


 


Urine Opiates Screen   NEG 


 


Urine Methadone, Qualitative   NEG 


 


Urine Barbiturates   NEG 


 


Urine Phencyclidine (PCP)


Level 


  


  NEG 


 


 


Ur


Amphetamine/Methamphetamine 


  


  NEG 


 


 


MDMA (Ecstasy) Screen   NEG 


 


Urine Benzodiazepines Screen   NEG 


 


Urine Cocaine Metabolite   NEG 


 


Urine Marijuana (THC)   POS 











Assessment & Plan


Bilateral upper extremity dysesthesias, left worse than right/cervical 

paraspinal muscle spasm--


Symptoms are more suggestive of orthopedic process involving cervical spine, as 

confirmed by CT of cervical spine.


Would recommend discharge to home on baclofen 10 mg by mouth 3 times a day when 

necessary along with heat and stretching with range of motion exercises.


He should follow-up with his PCP Dr. Anderson in 1 week or when necessary sooner.


His return to the emergency department should he experience any worsening of 

present symptoms or development of new symptoms.





Hypertension--continue metoprolol succinate 100 mg every morning and enalapril 

10 mg by mouth every morning.





Hyperlipidemia--continue atorvastatin 40 mg by mouth every evening.





GERD--continue pantoprazole 40 mg by mouth every morning.





Hepatic cirrhosis--continue folic acid, mag oxide, lactulose and thiamine as 

outpatient.

## 2017-10-14 NOTE — DIAGNOSTIC IMAGING REPORT
CT SCAN OF THE BRAIN WITHOUT IV CONTRAST



CLINICAL HISTORY: Strokelike symptoms.



COMPARISON STUDY:  CT of the brain dated 12/9/2009.



TECHNIQUE: Unenhanced axial CT scan of the brain is performed from the vertex to

the skull base.



CT DOSE: 614.27 mGy.cm



FINDINGS:



Brain parenchyma: There is minimal subcortical and periventricular

microangiopathic change. Foci of left frontal and left parietal encephalomalacia

are unchanged and consistent with remote infarcts. There is no hemorrhage, mass

effect, or evidence of acute territorial ischemia by CT criteria. Gray-white

matter is preserved. No extra-axial fluid collection is seen.



Ventricles, sulci, cisterns: Normal in configuration.



Intracranial vasculature: There is atherosclerotic calcification of the

cavernous carotid and vertebral arteries.



Calvarium: Unremarkable.



Sinuses and mastoids: The visualized paranasal sinuses are clear. The mastoid

air cells are well pneumatized.



Orbits: The bony orbits are grossly intact.





IMPRESSION:



1. There is no hemorrhage, mass effect, or evidence of acute territorial

ischemia by CT criteria.



2. Remote infarcts as above, unchanged from 2009.







Electronically signed by:  Ti Castillo M.D.

10/14/2017 1:44 PM



Dictated Date/Time:  10/14/2017 1:42 PM

## 2017-11-22 ENCOUNTER — HOSPITAL ENCOUNTER (OUTPATIENT)
Dept: HOSPITAL 45 - C.LAB1850 | Age: 57
Discharge: HOME | End: 2017-11-22
Attending: INTERNAL MEDICINE
Payer: COMMERCIAL

## 2017-11-22 DIAGNOSIS — D69.6: ICD-10-CM

## 2017-11-22 DIAGNOSIS — K74.60: ICD-10-CM

## 2017-11-22 DIAGNOSIS — E55.9: ICD-10-CM

## 2017-11-22 DIAGNOSIS — E78.5: Primary | ICD-10-CM

## 2017-11-22 DIAGNOSIS — E83.19: ICD-10-CM

## 2017-11-22 LAB
ALBUMIN/GLOB SERPL: 1.2 {RATIO} (ref 0.9–2)
ALP SERPL-CCNC: 70 U/L (ref 45–117)
ALT SERPL-CCNC: 32 U/L (ref 12–78)
ANION GAP SERPL CALC-SCNC: 7 MMOL/L (ref 3–11)
AST SERPL-CCNC: 28 U/L (ref 15–37)
BASOPHILS # BLD: 0.02 K/UL (ref 0–0.2)
BASOPHILS NFR BLD: 0.5 %
BUN SERPL-MCNC: 20 MG/DL (ref 7–18)
BUN/CREAT SERPL: 17.7 (ref 10–20)
CALCIUM SERPL-MCNC: 9.1 MG/DL (ref 8.5–10.1)
CHLORIDE SERPL-SCNC: 104 MMOL/L (ref 98–107)
CHOLEST/HDLC SERPL: 2.3 {RATIO}
CO2 SERPL-SCNC: 27 MMOL/L (ref 21–32)
COMPLETE: YES
CREAT SERPL-MCNC: 1.11 MG/DL (ref 0.6–1.4)
EOSINOPHIL NFR BLD AUTO: 92 K/UL (ref 130–400)
FERRITIN SERPL-MCNC: 34.1 NG/ML (ref 8–388)
GLOBULIN SER-MCNC: 3.5 GM/DL (ref 2.5–4)
GLUCOSE SERPL-MCNC: 78 MG/DL (ref 70–99)
GLUCOSE UR QL: 44 MG/DL
HCT VFR BLD CALC: 44.5 % (ref 42–52)
IG%: 0 %
IMM GRANULOCYTES NFR BLD AUTO: 32.3 %
KETONES UR QL STRIP: 45 MG/DL
LYMPHOCYTES # BLD: 1.4 K/UL (ref 1.2–3.4)
MCH RBC QN AUTO: 31.2 PG (ref 25–34)
MCHC RBC AUTO-ENTMCNC: 34.6 G/DL (ref 32–36)
MCV RBC AUTO: 90.1 FL (ref 80–100)
MONOCYTES NFR BLD: 9.5 %
NEUTROPHILS # BLD AUTO: 3.9 %
NEUTROPHILS NFR BLD AUTO: 53.8 %
NITRITE UR QL STRIP: 69 MG/DL (ref 0–150)
PH UR: 103 MG/DL (ref 0–200)
PMV BLD AUTO: 11.7 FL (ref 7.4–10.4)
POTASSIUM SERPL-SCNC: 3.9 MMOL/L (ref 3.5–5.1)
RBC # BLD AUTO: 4.94 M/UL (ref 4.7–6.1)
SODIUM SERPL-SCNC: 137 MMOL/L (ref 136–145)
TIBC SERPL-MCNC: 353 MCG/DL (ref 250–450)
VERY LOW DENSITY LIPOPROT CALC: 14 MG/DL
WBC # BLD AUTO: 4.33 K/UL (ref 4.8–10.8)

## 2017-11-25 LAB — AFP-TM SERPL-MCNC: 1.7 NG/ML (ref ?–6.1)

## 2018-01-29 ENCOUNTER — HOSPITAL ENCOUNTER (OUTPATIENT)
Dept: HOSPITAL 45 - C.ULTR | Age: 58
Discharge: HOME | End: 2018-01-29
Attending: PHYSICIAN ASSISTANT
Payer: COMMERCIAL

## 2018-01-29 DIAGNOSIS — K74.60: Primary | ICD-10-CM

## 2018-01-29 NOTE — DIAGNOSTIC IMAGING REPORT
ULTRASOUND RIGHT UPPER QUADRANT ABDOMEN



CLINICAL HISTORY: Cirrhosis.



COMPARISON STUDY: Abdominal CT dated 9/25/2015.



TECHNIQUE: Real-time, grayscale, and color flow sonography of the right upper

quadrant of the abdomen was performed. Images are reviewed in the transverse and

longitudinal planes.



FINDINGS:



Liver: The liver is cirrhotic in morphology and heterogeneous in echotexture.

There is nodularity of the surface contour. There is no sonographic evidence of

hepatic mass lesion. There is no intrahepatic biliary ductal dilatation. The

main portal vein is patent.



Gallbladder: The gallbladder is surgically absent. The common bile duct measures

up to 0.3 cm in diameter.



Pancreas: Not well visualized due to overlying bowel gas.



Right kidney: Survey images of the right kidney demonstrate normal size and

echotexture. There is no hydronephrosis. A 1.2 cm cyst is incidentally noted.



Ascites: None.





IMPRESSION:  



1. The liver is cirrhotic in morphology and heterogeneous in echotexture.



2. The gallbladder surgically absent.



3. The pancreas was not visualized.







Electronically signed by:  Ti Castillo M.D.

1/29/2018 9:11 AM



Dictated Date/Time:  1/29/2018 9:08 AM

## 2018-03-30 ENCOUNTER — HOSPITAL ENCOUNTER (OUTPATIENT)
Dept: HOSPITAL 45 - C.MRIBC | Age: 58
Discharge: HOME | End: 2018-03-30
Attending: PHYSICIAN ASSISTANT
Payer: COMMERCIAL

## 2018-03-30 DIAGNOSIS — H90.42: ICD-10-CM

## 2018-03-30 DIAGNOSIS — H93.12: Primary | ICD-10-CM

## 2018-03-30 NOTE — DIAGNOSTIC IMAGING REPORT
ORBIT RADIOGRAPHS 3 VIEWS



HISTORY:     pre-MRI screening.



COMPARISON: None.



FINDINGS: There are no radiopaque foreign bodies identified within the orbits.  

 



IMPRESSION:  

No radiopaque foreign bodies identified within the orbits.







Electronically signed by:  Boone Smith M.D.

3/30/2018 11:38 AM



Dictated Date/Time:  3/30/2018 11:38 AM

## 2018-03-30 NOTE — DIAGNOSTIC IMAGING REPORT
MRI OF THE BRAIN WITHOUT AND WITH IV CONTRAST



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



COMPARISON STUDY:  Noncontrast head CT dated 10/13/2017 MRI the brain dated

12/8/2006



TECHNIQUE: MRI of the brain was performed from the vertex to the skull base

utilizing various T1 and T2 weighted sequences. Following the IV administration

of 11 mL of Gadavist contrast, additional enhanced images were obtained.



FINDINGS: 

Sagittal T1, axial diffusion, proton density and T2 weighted axial, coronal

FLAIR, and pre and post axial T1-weighted images were acquired. These were

supplemented with post gadolinium coronal T1 weighted images. 

No intra or extra-axial mass lesions are visualized.

Axial diffusion-weighted images reveal no evidence of acute or subacute

infarction.

There is no evidence of ventricular dilatation.

Proton density T2-weighted and FLAIR images reveal scattered foci of increased

T2 signal within the white matter, likely on a small vessel basis. There are old

left frontal and left parietal lobe areas of encephalomalacia/infarction

There are no abnormal flow voids.

No cerebellopontine angle masses are visualized. There are no pathologically

enhancing lesions.



There are inflammatory changes present within the mastoids left greater than

right. There is polypoid mucosal thickening within the right maxillary sinus.





IMPRESSION:  

1. No evidence of intracranial mass. Specifically, no cerebellopontine angle

masses are visualized. There is no evidence of an acoustic neuroma

2. Old left frontal and parietal lobe areas of encephalomalacia/infarction

3. Inflammatory changes within the mastoids left greater than right. Polypoid

mucosal thickening within the right maxillary sinus.







Electronically signed by:  Boone Smith M.D.

3/30/2018 12:38 PM



Dictated Date/Time:  3/30/2018 12:32 PM

## 2018-05-07 ENCOUNTER — HOSPITAL ENCOUNTER (OUTPATIENT)
Dept: HOSPITAL 45 - C.LAB1850 | Age: 58
Discharge: HOME | End: 2018-05-07
Attending: INTERNAL MEDICINE
Payer: COMMERCIAL

## 2018-05-07 DIAGNOSIS — E78.5: Primary | ICD-10-CM

## 2018-05-07 LAB
ALBUMIN SERPL-MCNC: 4.1 GM/DL (ref 3.4–5)
ALP SERPL-CCNC: 67 U/L (ref 45–117)
ALT SERPL-CCNC: 33 U/L (ref 12–78)
AST SERPL-CCNC: 37 U/L (ref 15–37)
PROT SERPL-MCNC: 7.2 GM/DL (ref 6.4–8.2)